# Patient Record
Sex: MALE | Race: WHITE | NOT HISPANIC OR LATINO | Employment: FULL TIME | URBAN - METROPOLITAN AREA
[De-identification: names, ages, dates, MRNs, and addresses within clinical notes are randomized per-mention and may not be internally consistent; named-entity substitution may affect disease eponyms.]

---

## 2024-03-18 ASSESSMENT — ENCOUNTER SYMPTOMS
SORE THROAT: 1
SHORTNESS OF BREATH: 1
COUGH: 1

## 2024-03-19 ENCOUNTER — OFFICE VISIT (OUTPATIENT)
Dept: PRIMARY CARE | Facility: CLINIC | Age: 59
End: 2024-03-19
Payer: COMMERCIAL

## 2024-03-19 VITALS
SYSTOLIC BLOOD PRESSURE: 124 MMHG | HEIGHT: 70 IN | WEIGHT: 159.2 LBS | BODY MASS INDEX: 22.79 KG/M2 | DIASTOLIC BLOOD PRESSURE: 84 MMHG | TEMPERATURE: 96.3 F | HEART RATE: 72 BPM | OXYGEN SATURATION: 97 %

## 2024-03-19 DIAGNOSIS — J02.9 ACUTE PHARYNGITIS, UNSPECIFIED ETIOLOGY: Primary | ICD-10-CM

## 2024-03-19 DIAGNOSIS — E78.5 HYPERLIPIDEMIA, UNSPECIFIED HYPERLIPIDEMIA TYPE: ICD-10-CM

## 2024-03-19 DIAGNOSIS — T63.441S BEE STING REACTION, ACCIDENTAL OR UNINTENTIONAL, SEQUELA: ICD-10-CM

## 2024-03-19 PROCEDURE — 1036F TOBACCO NON-USER: CPT | Performed by: FAMILY MEDICINE

## 2024-03-19 PROCEDURE — 99214 OFFICE O/P EST MOD 30 MIN: CPT | Performed by: FAMILY MEDICINE

## 2024-03-19 RX ORDER — EPINEPHRINE 0.3 MG/.3ML
1 INJECTION SUBCUTANEOUS ONCE AS NEEDED
Qty: 1 EACH | Refills: 0 | Status: SHIPPED | OUTPATIENT
Start: 2024-03-19

## 2024-03-19 RX ORDER — MULTIVITAMIN/IRON/FOLIC ACID 18MG-0.4MG
1 TABLET ORAL DAILY
COMMUNITY

## 2024-03-19 ASSESSMENT — PATIENT HEALTH QUESTIONNAIRE - PHQ9
1. LITTLE INTEREST OR PLEASURE IN DOING THINGS: NOT AT ALL
SUM OF ALL RESPONSES TO PHQ9 QUESTIONS 1 AND 2: 0
2. FEELING DOWN, DEPRESSED OR HOPELESS: NOT AT ALL

## 2024-03-19 ASSESSMENT — ENCOUNTER SYMPTOMS
SORE THROAT: 1
SHORTNESS OF BREATH: 1
COUGH: 1

## 2024-03-19 ASSESSMENT — LIFESTYLE VARIABLES
HOW MANY STANDARD DRINKS CONTAINING ALCOHOL DO YOU HAVE ON A TYPICAL DAY: 1 OR 2
AUDIT-C TOTAL SCORE: 1
HOW OFTEN DO YOU HAVE SIX OR MORE DRINKS ON ONE OCCASION: NEVER
SKIP TO QUESTIONS 9-10: 1
HOW OFTEN DO YOU HAVE A DRINK CONTAINING ALCOHOL: MONTHLY OR LESS

## 2024-03-19 ASSESSMENT — PAIN SCALES - GENERAL: PAINLEVEL: 4

## 2024-03-19 NOTE — PROGRESS NOTES
"History Of Present Illness  Cb Hudson \"Genaro\" is a 58 y.o. male presenting for pain (Neck pain x 2 weeks when swallowing. States 1 month ago felt like he had a sore throat. States he has felt very fatigued the past few weeks, raspy voice. States he doesn't feel any swollen glands but it feels tender. ), imaging (CT Calcium scoring), and EPI PEN (Discuss getting epi pen. States 5 yrs ago was stung and hand and lower forearm swelled up)  .    Sore Throat   This is a new problem. The current episode started 1 to 4 weeks ago. The problem has been gradually worsening. The pain is worse on the right side. There has been no fever. The pain is at a severity of 4/10. Associated symptoms include coughing and shortness of breath. He has had no exposure to strep or mono.   The sore throat has resolved but he has persistent right neck tenderness. Took ibuprofen with relief of pain. No fever, chills, night sweats, cough.     He also has a history of hyperlipidemia. Last lipid panel in the system was 2019, but he says he's had it done in the past 5 years and it was stable. He is healthy, running and doing triathlons, but concerned for heart disease. He denies chest pain, dyspnea, smoking.     He also requests an epi pen for being in the woods and having a history of local bee sting reactions.      Past Medical History  There are no problems to display for this patient.       Medications  Current Outpatient Medications on File Prior to Visit   Medication Sig    multivitamin with minerals iron-free (Complete MV Adult 50 Plus) Take 1 tablet by mouth once daily.     No current facility-administered medications on file prior to visit.        Surgical History  He has no past surgical history on file.     Social History  He reports that he has never smoked. He has never been exposed to tobacco smoke. He has never used smokeless tobacco. He reports that he does not use drugs. No history on file for alcohol use.    Family " "History  Family History   Problem Relation Name Age of Onset    Hyperlipidemia Mother      Coronary artery disease Father  85        Allergies  Cefaclor    Immunizations  Immunization History   Administered Date(s) Administered    Pfizer Gray Cap SARS-CoV-2 07/01/2022    Pfizer Purple Cap SARS-CoV-2 03/20/2021, 04/10/2021, 12/13/2021        ROS  Negative, except as discussed in HPI     Vitals  /84   Pulse 72   Temp 35.7 °C (96.3 °F)   Ht 1.778 m (5' 10\")   Wt 72.2 kg (159 lb 3.2 oz)   SpO2 97%   BMI 22.84 kg/m²      Physical Exam  Vitals and nursing note reviewed.   Constitutional:       General: He is not in acute distress.     Appearance: Normal appearance. He is not ill-appearing.   HENT:      Right Ear: Tympanic membrane normal.      Left Ear: Tympanic membrane normal.      Nose: Nose normal. No congestion or rhinorrhea.      Mouth/Throat:      Mouth: Mucous membranes are moist.      Pharynx: Oropharynx is clear. No oropharyngeal exudate or posterior oropharyngeal erythema.   Eyes:      Extraocular Movements: Extraocular movements intact.      Conjunctiva/sclera: Conjunctivae normal.      Pupils: Pupils are equal, round, and reactive to light.   Pulmonary:      Effort: Pulmonary effort is normal. No respiratory distress.      Breath sounds: Normal breath sounds.   Musculoskeletal:      Cervical back: Normal range of motion. No rigidity.   Lymphadenopathy:      Cervical: Cervical adenopathy (right sided) present.   Neurological:      Mental Status: He is alert.         Relevant Results  Lab Results   Component Value Date    WBC 5.3 11/12/2019    HGB 16.9 (H) 11/12/2019    HCT 51.7 (H) 11/12/2019    MCV 91.5 11/12/2019     11/12/2019     Lab Results   Component Value Date     11/12/2019    K 4.7 11/12/2019     11/12/2019    CO2 23 (L) 11/12/2019    BUN 17 11/12/2019    CREATININE 0.9 11/12/2019    CALCIUM 10.0 11/12/2019    PROT 7.9 11/12/2019    BILITOT 1.0 11/12/2019    ALKPHOS 82 " "11/12/2019    ALT 30 11/12/2019    AST 50 (H) 11/12/2019    GLUCOSE 98 11/12/2019     No results found for: \"HGBA1C\"  No results found for: \"TSH\", \"FREET4\"   Lab Results   Component Value Date    CHOL 294 (H) 11/12/2019    TRIG 100 11/12/2019     11/12/2019           Assessment/Plan   Cb was seen today for pain, imaging and epi pen.  Diagnoses and all orders for this visit:  Acute pharyngitis, unspecified etiology (Primary)  Comments:  Reactive lymphadenopathy, monitor, ibuprofen/tylenol prn  Hyperlipidemia, unspecified hyperlipidemia type  Comments:  Pt declines labs. Discussed ASCVD score based on 2019 lipid panel is 7.1%  Orders:  -     CT cardiac scoring wo IV contrast; Future  Bee sting reaction, accidental or unintentional, sequela  -     EPINEPHrine 0.3 mg/0.3 mL injection syringe; Inject 0.3 mL (0.3 mg) into the muscle 1 time if needed for anaphylaxis for up to 1 dose. Inject into upper leg. Call 911 after use.       There are no discontinued medications.     Counseling:   Medication education:   -Education:  The patient is counseled regarding potential side-effects of any and all new medications  -Understanding:  Patient expressed understanding of information discussed today  -Adherence:  No barriers to adherence identified    Final treatment plan is a result of shared decision making with patient.         Nikolai Solis MD   "

## 2024-05-23 ENCOUNTER — HOSPITAL ENCOUNTER (OUTPATIENT)
Dept: RADIOLOGY | Facility: CLINIC | Age: 59
Discharge: HOME | End: 2024-05-23
Payer: COMMERCIAL

## 2024-05-23 DIAGNOSIS — E78.5 HYPERLIPIDEMIA, UNSPECIFIED HYPERLIPIDEMIA TYPE: ICD-10-CM

## 2024-05-23 PROCEDURE — 75571 CT HRT W/O DYE W/CA TEST: CPT

## 2024-05-26 DIAGNOSIS — E78.5 HYPERLIPIDEMIA, UNSPECIFIED HYPERLIPIDEMIA TYPE: Primary | ICD-10-CM

## 2024-05-26 DIAGNOSIS — R93.1 ABNORMAL HEART SCORE CT: ICD-10-CM
